# Patient Record
Sex: MALE | Race: ASIAN | NOT HISPANIC OR LATINO
[De-identification: names, ages, dates, MRNs, and addresses within clinical notes are randomized per-mention and may not be internally consistent; named-entity substitution may affect disease eponyms.]

---

## 2024-01-24 PROBLEM — Z00.00 ENCOUNTER FOR PREVENTIVE HEALTH EXAMINATION: Status: ACTIVE | Noted: 2024-01-24

## 2024-01-26 ENCOUNTER — APPOINTMENT (OUTPATIENT)
Dept: CARDIOLOGY | Facility: CLINIC | Age: 29
End: 2024-01-26
Payer: COMMERCIAL

## 2024-01-26 VITALS
TEMPERATURE: 97.5 F | WEIGHT: 213 LBS | BODY MASS INDEX: 28.23 KG/M2 | OXYGEN SATURATION: 95 % | DIASTOLIC BLOOD PRESSURE: 82 MMHG | SYSTOLIC BLOOD PRESSURE: 131 MMHG | HEIGHT: 73 IN | RESPIRATION RATE: 18 BRPM | HEART RATE: 84 BPM

## 2024-01-26 DIAGNOSIS — Z82.49 FAMILY HISTORY OF ISCHEMIC HEART DISEASE AND OTHER DISEASES OF THE CIRCULATORY SYSTEM: ICD-10-CM

## 2024-01-26 DIAGNOSIS — R07.89 OTHER CHEST PAIN: ICD-10-CM

## 2024-01-26 DIAGNOSIS — Z78.9 OTHER SPECIFIED HEALTH STATUS: ICD-10-CM

## 2024-01-26 PROCEDURE — 93015 CV STRESS TEST SUPVJ I&R: CPT

## 2024-01-26 PROCEDURE — 93306 TTE W/DOPPLER COMPLETE: CPT

## 2024-01-26 PROCEDURE — 99204 OFFICE O/P NEW MOD 45 MIN: CPT | Mod: 25

## 2024-01-30 PROBLEM — Z78.9 SOCIAL ALCOHOL USE: Status: ACTIVE | Noted: 2024-01-30

## 2024-01-30 PROBLEM — Z82.49 FAMILY HISTORY OF HYPERTENSION: Status: ACTIVE | Noted: 2024-01-30

## 2024-01-30 NOTE — HISTORY OF PRESENT ILLNESS
[FreeTextEntry1] : 28 year-old male with HTN, elevated cholesterol presents for evaluation of CP. Patient reports right sided CP while getting up that is somewhat sharp, non-tender and not related to exertion. Patient denies SOB. Patient denies palpitations. Patient denies lightheadedness. Patient denies h/o syncope. Patient is on Losartan 50 mg. I advised patient to undergo an echocardiogram and a treadmill stress test.